# Patient Record
Sex: FEMALE | Race: WHITE | URBAN - METROPOLITAN AREA
[De-identification: names, ages, dates, MRNs, and addresses within clinical notes are randomized per-mention and may not be internally consistent; named-entity substitution may affect disease eponyms.]

---

## 2018-12-21 PROCEDURE — 87624 HPV HI-RISK TYP POOLED RSLT: CPT | Performed by: NURSE PRACTITIONER

## 2018-12-21 PROCEDURE — 88175 CYTOPATH C/V AUTO FLUID REDO: CPT | Performed by: NURSE PRACTITIONER

## 2022-11-27 ENCOUNTER — APPOINTMENT (OUTPATIENT)
Dept: URGENT CARE | Age: 37
End: 2022-11-27

## 2024-08-27 ENCOUNTER — OFFICE VISIT (OUTPATIENT)
Dept: OBGYN CLINIC | Facility: CLINIC | Age: 39
End: 2024-08-27

## 2024-08-27 VITALS
DIASTOLIC BLOOD PRESSURE: 84 MMHG | BODY MASS INDEX: 24.91 KG/M2 | SYSTOLIC BLOOD PRESSURE: 146 MMHG | HEIGHT: 66 IN | WEIGHT: 155 LBS

## 2024-08-27 DIAGNOSIS — Z12.31 ENCOUNTER FOR SCREENING MAMMOGRAM FOR BREAST CANCER: ICD-10-CM

## 2024-08-27 DIAGNOSIS — Z01.419 ENCOUNTER FOR WELL WOMAN EXAM WITH ROUTINE GYNECOLOGICAL EXAM: Primary | ICD-10-CM

## 2024-08-27 DIAGNOSIS — Z12.4 ENCOUNTER FOR PAPANICOLAOU SMEAR FOR CERVICAL CANCER SCREENING: ICD-10-CM

## 2024-08-27 PROCEDURE — 3079F DIAST BP 80-89 MM HG: CPT | Performed by: OBSTETRICS & GYNECOLOGY

## 2024-08-27 PROCEDURE — 99386 PREV VISIT NEW AGE 40-64: CPT | Performed by: OBSTETRICS & GYNECOLOGY

## 2024-08-27 PROCEDURE — 3008F BODY MASS INDEX DOCD: CPT | Performed by: OBSTETRICS & GYNECOLOGY

## 2024-08-27 PROCEDURE — 3077F SYST BP >= 140 MM HG: CPT | Performed by: OBSTETRICS & GYNECOLOGY

## 2024-08-27 RX ORDER — BIOTIN 1 MG
TABLET ORAL
COMMUNITY
Start: 2023-12-15

## 2024-08-27 RX ORDER — KETOCONAZOLE 20 MG/ML
SHAMPOO TOPICAL
COMMUNITY

## 2024-08-27 NOTE — PROGRESS NOTES
[facility]  Obstetrics and Gynecology  Gynecology Visit    Chief Complaint   Patient presents with    Annual         Li BURCH Tucker is a 39 year old female who presents for annual exam.    LMP: 2024.    Menses regular: yes.    Menstrual flow normal: yes.    Birth control or HRT:  0.   Refill 00  Last Pap Smear: .  Any history of abnormal paps: No hx abn   Last M MG: up to date  Any Medication Refills needed today?: no  Sleep: 7-8 hours.    Diet: balanced    Exercise: walking/weight lifting.   Screening labs/Blood work today: no.     Colonoscopy (if over 46 y/o): n/a.   Gardasil:(age 9-46 y/o) n/a.   Genetic Cancer screen (if indicated): no.   Flu (Aug-April): n/a.TDAP (every 10 years) up to date.      Additional Problems/concerns: No concerns.      Next Appt: will call    Immunization History   Administered Date(s) Administered    Covid-19 Vaccine Pfizer 30 mcg/0.3 ml 2021, 2021    Covid-19 Vaccine Pfizer Bivalent 30mcg/0.3mL 10/18/2022    HEP A 2018    Hepatitis A 2018    TDAP 2018         Current Outpatient Medications:     SUMAtriptan Succinate (IMITREX OR), , Disp: , Rfl:     polyethylene glycol, PEG 3350-KCl-NaBcb-NaCl-NaSulf, 236 g Oral Recon Soln, Take as directed by MD, Disp: 4000 mL, Rfl: 0    Norethin Ace-Eth Estrad-FE (MICROGESTIN FE ) 1-20 MG-MCG Oral Tab, Take 1 tablet by mouth daily., Disp: 3 Package, Rfl: 4    Multiple Vitamins-Minerals (MULTI-VITAMIN/MINERALS) Oral Tab, Take 1 tablet by mouth daily., Disp: , Rfl:     Allergies   Allergen Reactions    Erythromycin HIVES    Sulfa Antibiotics HIVES       OB History    Para Term  AB Living   0 0 0 0 0 0   SAB IAB Ectopic Multiple Live Births   0 0 0 0 0       Past Medical History:    Allergic rhinitis    Hearing loss    Migraines    Other abnormal clinical finding    Other headache syndromes(339.89)    Other malaise and fatigue    Palpitations    Varicella       Past Surgical History:    Procedure Laterality Date    Colonoscopy N/A 8/30/2021    Procedure: COLONOSCOPY, with polypectomy;  Surgeon: Fausto Candelario MD;  Location: [facility], [facility]    Electrocardiogram, complete  7/2/2010    Tonsillectomy  1996       Family History   Problem Relation Age of Onset    Stroke Paternal Grandfather     Hypertension Maternal Grandfather     Lipids Maternal Grandfather     Alcohol and Other Disorders Associated Maternal Grandfather         alcohol abuse    Heart Disease Maternal Grandfather     Other (Other) Maternal Grandmother         aneurysm    Hypertension Father     Lipids Father     Heart Disease Father     Heart Attack Father 40    Hypertension Mother     Lipids Mother     Heart Disease Mother     Uterine Cancer Mother 61    Hypertension Paternal Grandmother     Lipids Paternal Grandmother                Social History     Socioeconomic History    Marital status: Single     Spouse name: Not on file    Number of children: Not on file    Years of education: Not on file    Highest education level: Not on file   Occupational History    Not on file   Tobacco Use    Smoking status: Never    Smokeless tobacco: Never   Vaping Use    Vaping status: Never Used   Substance and Sexual Activity    Alcohol use: Yes     Alcohol/week: 9.0 standard drinks of alcohol     Types: 5 Glasses of wine, 4 Standard drinks or equivalent per week    Drug use: No    Sexual activity: Not Currently   Other Topics Concern    Not on file   Social History Narrative    Not on file     Social Determinants of Health     Financial Resource Strain: Not on file   Food Insecurity: Not on file   Transportation Needs: Not on file   Physical Activity: Not on file   Stress: Not on file   Social Connections: Not on file   Housing Stability: Low Risk  (7/25/2024)    Received from [facility]    Housing Stability     Mortgage Payment Concerns?: Not on file     Number of Places Lived in the Last Year: Not on file      Unstable Housing?: Not on file     /84   Ht 5' 6\" (1.676 m)   Wt 154 lb 15.7 oz (70.3 kg)   LMP 08/09/2024   BMI 25.01 kg/m²     Wt Readings from Last 3 Encounters:   08/27/24 154 lb 15.7 oz (70.3 kg)   08/30/21 162 lb (73.5 kg)   08/25/21 166 lb (75.3 kg)         Health Maintenance   Topic Date Due    Influenza Vaccine (1) 08/01/2021    Screen for Cervical Cancer 11/05/2021    DTaP,Tdap and Td Vaccines (3 - Td or Tdap) 03/18/2025    Hepatitis C Screening Completed    HIV Screening Completed    COVID-19 Vaccine Completed     Review of Systems   General: Present- Feeling well. Not Present- Chills, Fever, Weight Gain and Weight Loss.  HEENT: Not Present- Headache and Sore Throat.  Respiratory: Not Present- Cough, Difficulty Breathing, Hemoptysis and Sputum Production.  Cardiovascular: Not Present- Chest Pain, Elevated Blood Pressure, Fainting / Blacking Out and Shortness of Breath.  Gastrointestinal: Not Present- Constipation, Diarrhea, Nausea and Vomiting.  Female Genitourinary: Not Present- Discharge, Dysuria and Frequency.  Musculoskeletal: Not Present- Leg Cramps and Swelling of Extremities.  Neurological: Not Present- Dizziness and Headaches.  Psychiatric: Not Present- Anxiety and Depression.  Endocrine: Not Present- Appetite Changes, Hair Changes and Thyroid Problems.  Hematology: Not Present- Easy Bruising and Excessive bleeding.  All other systems negative     Physical Exam The physical exam findings are as follows:     General   Mental Status - Alert. General Appearance - Cooperative. Orientation - Oriented X4. Build & Nutrition - Well nourished.    Head and Neck  Thyroid   Gland Characteristics - normal size and consistency.    Chest and Lung Exam   Inspection:   Chest Wall: - Normal.  Percussion:   Quality and Intensity: - Percussion normal.  Palpation: - Palpation normal.  Auscultation:   Breath sounds: - Normal.  Adventitious sounds: - No Adventitious sounds.    Breast   Nipples: Characteristics  - Bilateral - Normal. Discharge - Bilateral - None.  Breast - Bilateral - Symmetric.    Cardiovascular   Auscultation: Rhythm - Regular. Heart Sounds - Normal heart sounds.  Murmurs & Other Heart Sounds: Auscultation of the heart reveals - No Murmurs.    Abdomen   Inspection: Inspection of the abdomen reveals - No Hernias. Incisional scars - no incisional scars.  Palpation/Percussion: Palpation and Percussion of the abdomen reveal - Non Tender and No Palpable abdominal masses.  Liver: - Normal.  Auscultation: Auscultation of the abdomen reveals - Bowel sounds normal.    Female Genitourinary     External Genitalia   Perineum - Normal. Bartholin's Gland - Bilateral - Normal. Clitoris - Normal.  Introitus: Characteristics - No Cystocele, Enterocele or Rectocele. Discharge - None.  Labia Majora: Lesions - Bilateral - None. Characteristics - Bilateral - Normal.  Labia Minora: Lesions - Bilateral - None. Characteristics - Bilateral - Normal.  Urethra: Characteristics - Normal. Discharge - None.  Larose Gland - Bilateral - Normal.  Vulva: Characteristics - Normal. Lesions - None.    Speculum & Bimanual   Vagina:   Vaginal Wall: - Normal.  Vaginal Lesions - None. Vaginal Mucosa - Normal.  Cervix: Characteristics - No Motion tenderness. Discharge - None.  Uterus: Characteristics - Normal. Position - Midposition.  Adnexa: Characteristics - Bilateral - Normal. Masses - No Adnexal Masses.  Wet Mount: pH - 3.8-4.2. Vaginal discharge - Clear  and Thin. Amine Odor - Absent. Main patient complaints - Discharge.     Rectal   Anorectal Exam: External - normal external exam.    Peripheral Vascular   Upper Extremity:   Palpation: - Pulses bilaterally normal.  Lower Extremity: Inspection - Bilateral - Inspection Normal.  Palpation: Edema - Bilateral - No edema.    Neurologic   Mental Status: - Normal.    Lymphatic  General Lymphatics   Description - Normal .       1. Encounter for well woman exam with routine gynecological exam  -  ThinPrep PAP with HPV Reflex Request B; Future    2. Encounter for Papanicolaou smear for cervical cancer screening  - ThinPrep PAP with HPV Reflex Request B; Future    3. Encounter for screening mammogram for breast cancer

## 2024-08-28 ENCOUNTER — WALK IN (OUTPATIENT)
Dept: URGENT CARE | Age: 39
End: 2024-08-28

## 2024-08-28 VITALS
DIASTOLIC BLOOD PRESSURE: 73 MMHG | RESPIRATION RATE: 16 BRPM | OXYGEN SATURATION: 100 % | SYSTOLIC BLOOD PRESSURE: 137 MMHG | HEART RATE: 76 BPM | TEMPERATURE: 98.4 F

## 2024-08-28 DIAGNOSIS — L03.114 CELLULITIS OF LEFT UPPER EXTREMITY: Primary | ICD-10-CM

## 2024-08-28 RX ORDER — PREDNISONE 20 MG/1
40 TABLET ORAL DAILY
Qty: 10 TABLET | Refills: 0 | Status: SHIPPED | OUTPATIENT
Start: 2024-08-28 | End: 2024-09-02

## 2024-08-28 RX ORDER — CEPHALEXIN 500 MG/1
500 CAPSULE ORAL 3 TIMES DAILY
Qty: 15 CAPSULE | Refills: 0 | Status: SHIPPED | OUTPATIENT
Start: 2024-08-28 | End: 2024-09-02

## 2024-09-02 LAB
.: NORMAL
.: NORMAL

## 2024-09-03 LAB — HPV E6+E7 MRNA CVX QL NAA+PROBE: NEGATIVE

## 2024-12-10 ENCOUNTER — HOSPITAL ENCOUNTER (OUTPATIENT)
Dept: MAMMOGRAPHY | Age: 39
Discharge: HOME | End: 2024-12-10
Payer: COMMERCIAL

## 2024-12-10 DIAGNOSIS — Z12.31 ENCOUNTER FOR SCREENING MAMMOGRAM FOR MALIGNANT NEOPLASM OF BREAST: ICD-10-CM

## 2024-12-10 PROCEDURE — 77063 BREAST TOMOSYNTHESIS BI: CPT

## 2024-12-10 PROCEDURE — 77067 SCR MAMMO BI INCL CAD: CPT

## 2025-05-08 ENCOUNTER — TELEPHONE (OUTPATIENT)
Dept: OBGYN CLINIC | Facility: CLINIC | Age: 40
End: 2025-05-08

## 2025-05-08 NOTE — TELEPHONE ENCOUNTER
Patient needs assistance rescheduling 5/29 appointment.  Nothing availabe in [facility]DR until 7/2    Pls advise

## 2025-06-04 ENCOUNTER — OFFICE VISIT (OUTPATIENT)
Dept: OBGYN CLINIC | Facility: CLINIC | Age: 40
End: 2025-06-04

## 2025-06-04 VITALS
WEIGHT: 153 LBS | BODY MASS INDEX: 24.59 KG/M2 | SYSTOLIC BLOOD PRESSURE: 141 MMHG | DIASTOLIC BLOOD PRESSURE: 91 MMHG | HEIGHT: 66 IN

## 2025-06-04 DIAGNOSIS — D25.1 INTRAMURAL LEIOMYOMA OF UTERUS: Primary | ICD-10-CM

## 2025-06-04 DIAGNOSIS — R10.2 PELVIC PAIN: ICD-10-CM

## 2025-06-04 DIAGNOSIS — N83.201 CYST OF RIGHT OVARY: ICD-10-CM

## 2025-06-04 PROCEDURE — 99213 OFFICE O/P EST LOW 20 MIN: CPT | Performed by: OBSTETRICS & GYNECOLOGY

## 2025-06-04 PROCEDURE — 3008F BODY MASS INDEX DOCD: CPT | Performed by: OBSTETRICS & GYNECOLOGY

## 2025-06-04 PROCEDURE — 3077F SYST BP >= 140 MM HG: CPT | Performed by: OBSTETRICS & GYNECOLOGY

## 2025-06-04 PROCEDURE — 3080F DIAST BP >= 90 MM HG: CPT | Performed by: OBSTETRICS & GYNECOLOGY

## 2025-06-04 RX ORDER — DROSPIRENONE 4 MG/1
1 TABLET, FILM COATED ORAL DAILY
Qty: 84 EACH | Refills: 5 | Status: SHIPPED | OUTPATIENT
Start: 2025-06-04 | End: 2026-06-04

## 2025-06-04 NOTE — PROGRESS NOTES
Chief Complaint   Patient presents with    Follow - Up         Li Ceballos is a 40 year old female who presents for on and off pelvic pain on R side during cycles.  Pt states she had ultrasound in [place] and it showed nodule on R ovary. Pt also states  was 35,  prescribed Slynd and is currently taking it, will start [place] Drospirenone as prescribed once she is done with Slynd.   LMP: 2025.    Menses regular: yes.    Menstrual flow normal: heavy.    Birth control or HRT: Slynd.   Refill 0  Last Pap Smear: 2024 . Any history of abnormal paps: no hx abn   Gardasil:(age 9-44 y/o) n/a.   Any medication refills needed today?: none    Problems/concerns: Pt states she feels a rush of cortisol, anxiety rush     Next Appt: n/a        Immunization History   Administered Date(s) Administered    Covid-19 Vaccine Pfizer 30 mcg/0.3 ml 2021, 2021    Covid-19 Vaccine Pfizer Bivalent 30mcg/0.3mL 10/18/2022    HEP A 2018    Hepatitis A 2018    TDAP 2018       Medications - Current[1]    Allergies[2]    OB History    Para Term  AB Living   0 0 0 0 0 0   SAB IAB Ectopic Multiple Live Births   0 0 0 0 0       Gyn History      No data recorded      Latest Ref Rng & Units 2024     6:41 PM 2018     2:06 PM   RECENT PAP RESULTS   INTERPRETATION/RESULT:  NEGATIVE FOR INTRAEPITHELIAL LESION OR MALIGNANCY.  Negative for intraepithelial lesion or malignancy    HPV Negative Negative  Negative            Past Medical History[3]    Past Surgical History[4]    Family History[5]     Tobacco  Allergies  Meds  Med Hx  Surg Hx  Fam Hx  Soc Hx        Social History     Socioeconomic History    Marital status: Single     Spouse name: Not on file    Number of children: Not on file    Years of education: Not on file    Highest education level: Not on file   Occupational History    Not on file   Tobacco Use    Smoking status: Never     Passive exposure: Never    Smokeless  tobacco: Never   Vaping Use    Vaping status: Never Used   Substance and Sexual Activity    Alcohol use: Yes     Alcohol/week: 6.0 standard drinks of alcohol     Types: 6 Glasses of wine per week    Drug use: No    Sexual activity: Not Currently   Other Topics Concern    Not on file   Social History Narrative    Not on file     Social Drivers of Health     Food Insecurity: Not on file   Transportation Needs: Not on file   Stress: Not on file   Housing Stability: Low Risk  (7/25/2024)    Received from [facility]    Housing Stability     Mortgage Payment Concerns?: Not on file     Number of Places Lived in the Last Year: Not on file     Unstable Housing?: Not on file     BP (!) 141/91   Ht 5' 6\" (1.676 m)   Wt 153 lb   LMP 05/28/2025     Wt Readings from Last 3 Encounters:   06/04/25 153 lb   08/27/24 154 lb 15.7 oz   08/30/21 162 lb       Health Maintenance   Topic Date Due    Annual Physical  Never done    COVID-19 Vaccine (4 - 2024-25 season) 09/01/2024    Annual Depression Screening  01/01/2025    Pap Smear  08/27/2025    Influenza Vaccine (Season Ended) 10/01/2025    Mammogram  12/10/2025    DTaP,Tdap,and Td Vaccines (2 - Td or Tdap) 07/12/2028    Colorectal Cancer Screening  08/30/2031    Pneumococcal Vaccine: Birth to 50yrs  Aged Out    Meningococcal B Vaccine  Aged Out         Review of Systems   General: Present- Feeling well. Not Present- Fever.  Female Genitourinary: Not Present- Dysmenorrhea, Dyspareunia, Flank Pain, Frequency, Urgency, Urinary Complaints, Vaginal Bleeding and Vaginal dryness.  Pain: Present- Pain Rating - 0 on a 0-10 scale.  All other systems negative       Physical Exam   The physical exam findings are as follows:     Abdomen   Inspection: - Inspection Normal.  Palpation/Percussion: Palpation and Percussion of the abdomen reveal - Non Tender, No hepatosplenomegaly and No Palpable abdominal masses.    Female Genitourinary     External Genitalia   Perineum - Normal.  Bartholin's Gland - Bilateral - Normal. Clitoris - Normal.  Introitus: Characteristics - Normal. Discharge - None.  Labia Majora: Lesions - Bilateral - None. Characteristics - Bilateral - Normal.  Labia Minora: Lesions - Bilateral - None. Characteristics - Bilateral - Normal.  Urethra: Characteristics - Normal. Discharge - None.  Magnet Gland - Bilateral - Normal.  Vulva: Characteristics - Normal. Lesions - None.    Speculum & Bimanual   Vagina:   Vaginal Wall: - Normal.  Vaginal Lesions - None. Vaginal Mucosa - Normal.  Cervix: Characteristics - No Motion tenderness. Discharge - None.  Uterus: Characteristics - Non Tender. Position - Midposition.  Adnexa: Characteristics - Bilateral - Tender. Masses - No Adnexal Masses.  Bladder - Normal.    Rectal   Anorectal Exam: External - normal external exam.    Lymphatic  General Lymphatics   Description - Normal .    1. Intramural leiomyoma of uterus    2. Cyst of right ovary    3. Pelvic pain      - TVUS to be scheduled to evaluate if cyst has resolved or observe for any abnormalities  - Ca125 ordered to be rechecked in 6 months, patient reassured an elevated Ca125 can be inflammation related and not concerning  - Patient instructed to continue taking Slynd, prescription refilled and patient provided with samples in the meantime. Patient to take Slynd continuously by skipping placebo pills every 3 months to allow menses bleeding to regulate.    Monica MIMSS      Patient seen and examined, Agree with assessment and plan of care documented by PA student.     Therese Rosa MD             [1]   Current Outpatient Medications:     Drospirenone (SLYND OR), Take by mouth., Disp: , Rfl:     Biotin 1000 MCG Oral Tab, , Disp: , Rfl:     ketoconazole 2 % External Shampoo, , Disp: , Rfl:     Magnesium Glycinate 120 MG Oral Cap, , Disp: , Rfl:     Multiple Vitamins-Minerals (MULTI-VITAMIN/MINERALS) Oral Tab, Take 1 tablet by mouth daily., Disp: , Rfl:   [2]    Allergies  Allergen Reactions    Erythromycin HIVES    Sulfa Antibiotics HIVES    Seasonal Runny nose   [3]   Past Medical History:   Allergic rhinitis    Anxiety    Dysmenorrhea    I have always struggled with cramps    Dyspareunia    Endometriosis    A prior doctor suspected I might have it, but I've never been formally diagnosed.    Hearing loss    Heart murmur    Migraine headache with aura    Migraines    Other abnormal clinical finding    Other headache syndromes(339.89)    Other malaise and fatigue    Palpitations    Varicella   [4]   Past Surgical History:  Procedure Laterality Date    Colonoscopy N/A 8/30/2021    Procedure: COLONOSCOPY, with polypectomy;  Surgeon: Fausto Candelario MD;  Location: [facility], [facility]    Electrocardiogram, complete  7/2/2010    Tonsillectomy  1996   [5]   Family History  Problem Relation Age of Onset    Hypertension Mother     Lipids Mother     Heart Disease Mother     Uterine Cancer Mother 61    Cancer Mother         Uterine & vaginal; skin    Hypertension Father     Lipids Father     Heart Disease Father     Heart Attack Father 40    Cancer Father         Bladder & Skin    Obesity Father     Other (Other) Maternal Grandmother         aneurysm    Hypertension Maternal Grandfather     Lipids Maternal Grandfather     Alcohol and Other Disorders Associated Maternal Grandfather         alcohol abuse    Heart Disease Maternal Grandfather     Hypertension Paternal Grandmother     Lipids Paternal Grandmother     Stroke Paternal Grandfather     Breast Cancer Paternal Aunt 50 - 59

## 2025-07-01 ENCOUNTER — PATIENT MESSAGE (OUTPATIENT)
Dept: OBGYN CLINIC | Facility: CLINIC | Age: 40
End: 2025-07-01

## 2025-07-03 ENCOUNTER — TELEPHONE (OUTPATIENT)
Dept: OBGYN CLINIC | Facility: CLINIC | Age: 40
End: 2025-07-03

## 2025-07-03 NOTE — TELEPHONE ENCOUNTER
Pt name and  verified     Calling back regarding  [employer] message.     Second week of slynd. Pt reports she has felt more sad and down since starting slynd. Pt believes slynd has magnified recent events (recent break up). EPDS . Pt states she has a therapist that she has seen since April. Pt denies desire of wanting to harm self or others. Pt also reports increased spotting and cramping. Routing to Dr. Rosa for recommendations. Pt wondering if she should stop until mental health symptoms improve or if there is an alternative.

## 2025-07-03 NOTE — TELEPHONE ENCOUNTER
Per Dr. Rosa - was on slynd when came to me - ask if any contraindications to estrogen? if not can switch to alesse or offer IUD     Pt name and  verified     Asked pt if she has any contraindications with estrogen. Pt states she gets migraines and was told not to due to elevated BP. Will inform to Dr. Rosa to form a plan of care. Pt states okay to communicate via [employer] as she will be unavailable for calls until 5pm.